# Patient Record
Sex: FEMALE | URBAN - METROPOLITAN AREA
[De-identification: names, ages, dates, MRNs, and addresses within clinical notes are randomized per-mention and may not be internally consistent; named-entity substitution may affect disease eponyms.]

---

## 2020-11-23 ENCOUNTER — OFFICE VISIT (OUTPATIENT)
Dept: URBAN - METROPOLITAN AREA CLINIC 83 | Facility: CLINIC | Age: 85
End: 2020-11-23
Payer: COMMERCIAL

## 2020-11-23 DIAGNOSIS — H35.3112 NONEXUDATIVE AGE-RELATED MACULAR DEGENERATION, RIGHT EYE, INTERMEDIATE DRY STAGE: ICD-10-CM

## 2020-11-23 PROCEDURE — 92134 CPTRZ OPH DX IMG PST SGM RTA: CPT | Performed by: OPHTHALMOLOGY

## 2020-11-23 PROCEDURE — 99204 OFFICE O/P NEW MOD 45 MIN: CPT | Performed by: OPHTHALMOLOGY

## 2020-11-23 ASSESSMENT — INTRAOCULAR PRESSURE
OS: 15
OD: 12

## 2020-11-23 NOTE — IMPRESSION/PLAN
Impression: Exudative age-related macular degeneration, left eye, with active choroidal neovascularization: H35.3221. Left. s/p Eylea 9/30/2020 (Dr. Ping MCCALL [facility]) Plan: --exam/OCT reveal a stable PED, resolved SRF
--has done better with Eylea vs Avastin in the past
--findings d/w pt, rec cont anti-VEGF tx to maintain vision --pt understands tx may not improve VA but should reduce the risk of further visual loss --r/b/a of Eylea OS for NVAMD discussed --pt elects to cont Eylea under OCT guidance, injection sched for next week to allow for auth
--pt instructed to call with immediately with s/s VA loss/pain RTC 1 week for Eylea OS
then 8 weeks for OCT OU re-eval

## 2021-02-01 ENCOUNTER — OFFICE VISIT (OUTPATIENT)
Dept: URBAN - METROPOLITAN AREA CLINIC 83 | Facility: CLINIC | Age: 86
End: 2021-02-01
Payer: MEDICARE

## 2021-02-01 DIAGNOSIS — H35.3221 EXUDATIVE AGE-RELATED MACULAR DEGENERATION, LEFT EYE, WITH ACTIVE CHOROIDAL NEOVASCULARIZATION: Primary | ICD-10-CM

## 2021-02-01 DIAGNOSIS — Z96.1 PRESENCE OF INTRAOCULAR LENS: ICD-10-CM

## 2021-02-01 PROCEDURE — 67028 INJECTION EYE DRUG: CPT | Performed by: OPHTHALMOLOGY

## 2021-02-01 PROCEDURE — 99213 OFFICE O/P EST LOW 20 MIN: CPT | Performed by: OPHTHALMOLOGY

## 2021-02-01 PROCEDURE — 92134 CPTRZ OPH DX IMG PST SGM RTA: CPT | Performed by: OPHTHALMOLOGY

## 2021-02-01 ASSESSMENT — INTRAOCULAR PRESSURE
OD: 12
OS: 14

## 2021-02-01 NOTE — IMPRESSION/PLAN
Impression: Exudative age-related macular degeneration, left eye, with active choroidal neovascularization: H35.3221. Left. s/p Eylea 9/30/2020 (Dr. Valinda Dakin - [place]) Plan: --pt lost to f/u for 3 months
--exam/OCT reveal a stable PED, recurrent SRF OS
--has done better with Eylea vs Avastin in the past
--findings d/w pt, rec cont anti-VEGF tx to maintain vision --pt understands tx may not improve VA but should reduce the risk of further visual loss --r/b/a of Eylea OS for NVAMD discussed --pt elects to cont Eylea under OCT guidance, injection --pt instructed to call with immediately with s/s VA loss/pain RTC 8 weeks OCT OU re-eval Eylea

## 2021-03-29 ENCOUNTER — OFFICE VISIT (OUTPATIENT)
Dept: URBAN - METROPOLITAN AREA CLINIC 83 | Facility: CLINIC | Age: 86
End: 2021-03-29
Payer: MEDICARE

## 2021-03-29 PROCEDURE — 92134 CPTRZ OPH DX IMG PST SGM RTA: CPT | Performed by: OPHTHALMOLOGY

## 2021-03-29 PROCEDURE — 67028 INJECTION EYE DRUG: CPT | Performed by: OPHTHALMOLOGY

## 2021-03-29 ASSESSMENT — INTRAOCULAR PRESSURE
OD: 16
OS: 20

## 2021-03-29 NOTE — IMPRESSION/PLAN
Impression: Exudative age-related macular degeneration, left eye, with active choroidal neovascularization: H35.3221. Left. s/p Eylea 2/1/21 Plan: --exam/OCT reveal PED with improving SRF OS s/p Eylea
--has done better with Eylea vs Avastin in the past
--findings d/w pt, rec cont anti-VEGF tx to maintain vision  
--r/b/a of Eylea OS for NVAMD discussed --pt elects to cont Eylea under OCT guidance --pt instructed to call with immediately with s/s VA loss/pain

8 weeks - Dr. Rowdy Pinedo ([id number])

## 2023-09-15 ENCOUNTER — APPOINTMENT (RX ONLY)
Dept: URBAN - METROPOLITAN AREA CLINIC 138 | Facility: CLINIC | Age: 88
Setting detail: DERMATOLOGY
End: 2023-09-15

## 2023-09-15 VITALS — HEIGHT: 55 IN | WEIGHT: 145 LBS

## 2023-09-15 DIAGNOSIS — L82.1 OTHER SEBORRHEIC KERATOSIS: ICD-10-CM

## 2023-09-15 DIAGNOSIS — Z71.89 OTHER SPECIFIED COUNSELING: ICD-10-CM

## 2023-09-15 DIAGNOSIS — L57.8 OTHER SKIN CHANGES DUE TO CHRONIC EXPOSURE TO NONIONIZING RADIATION: ICD-10-CM

## 2023-09-15 PROCEDURE — ? COUNSELING

## 2023-09-15 PROCEDURE — 99203 OFFICE O/P NEW LOW 30 MIN: CPT

## 2023-09-15 PROCEDURE — ? TREATMENT REGIMEN

## 2023-09-15 ASSESSMENT — LOCATION SIMPLE DESCRIPTION DERM
LOCATION SIMPLE: RIGHT FOREHEAD
LOCATION SIMPLE: RIGHT UPPER BACK
LOCATION SIMPLE: RIGHT LOWER BACK
LOCATION SIMPLE: LEFT FOREHEAD

## 2023-09-15 ASSESSMENT — LOCATION DETAILED DESCRIPTION DERM
LOCATION DETAILED: RIGHT INFERIOR MEDIAL FOREHEAD
LOCATION DETAILED: LEFT INFERIOR FOREHEAD
LOCATION DETAILED: RIGHT MEDIAL FOREHEAD
LOCATION DETAILED: RIGHT MID-UPPER BACK
LOCATION DETAILED: RIGHT INFERIOR MEDIAL MIDBACK

## 2023-09-15 ASSESSMENT — LOCATION ZONE DERM
LOCATION ZONE: TRUNK
LOCATION ZONE: FACE

## 2023-09-15 NOTE — HPI: SKIN LESION
How Severe Is Your Skin Lesion?: mild
treated_been_treated
Is This A New Presentation, Or A Follow-Up?: Skin Lesion Referral
Who Is Your Referring Provider?: Maxi

## 2023-09-15 NOTE — PROCEDURE: TREATMENT REGIMEN
Plan: Discussed the use of moisturizer for dryness and liquid nitrogen for flare ups or discomfort.
Detail Level: Zone

## 2025-05-15 ENCOUNTER — APPOINTMENT (OUTPATIENT)
Dept: URBAN - METROPOLITAN AREA CLINIC 138 | Facility: CLINIC | Age: 89
Setting detail: DERMATOLOGY
End: 2025-05-15

## 2025-05-15 DIAGNOSIS — D485 NEOPLASM OF UNCERTAIN BEHAVIOR OF SKIN: ICD-10-CM

## 2025-05-15 PROBLEM — D48.5 NEOPLASM OF UNCERTAIN BEHAVIOR OF SKIN: Status: ACTIVE | Noted: 2025-05-15

## 2025-05-15 PROCEDURE — ? BIOPSY BY SHAVE METHOD

## 2025-05-15 PROCEDURE — 11102 TANGNTL BX SKIN SINGLE LES: CPT | Mod: 59

## 2025-05-15 PROCEDURE — 56605 BIOPSY OF VULVA/PERINEUM: CPT

## 2025-05-15 PROCEDURE — ? COUNSELING

## 2025-05-15 ASSESSMENT — LOCATION DETAILED DESCRIPTION DERM
LOCATION DETAILED: GENITALIA
LOCATION DETAILED: MONS PUBIS

## 2025-05-15 ASSESSMENT — LOCATION ZONE DERM
LOCATION ZONE: TRUNK
LOCATION ZONE: VULVA

## 2025-05-15 ASSESSMENT — LOCATION SIMPLE DESCRIPTION DERM
LOCATION SIMPLE: GROIN
LOCATION SIMPLE: GENITALIA